# Patient Record
Sex: FEMALE | Race: WHITE | NOT HISPANIC OR LATINO
[De-identification: names, ages, dates, MRNs, and addresses within clinical notes are randomized per-mention and may not be internally consistent; named-entity substitution may affect disease eponyms.]

---

## 2017-02-13 ENCOUNTER — APPOINTMENT (OUTPATIENT)
Dept: NEUROLOGY | Facility: CLINIC | Age: 57
End: 2017-02-13

## 2017-02-13 VITALS
HEIGHT: 60 IN | WEIGHT: 163 LBS | HEART RATE: 68 BPM | SYSTOLIC BLOOD PRESSURE: 130 MMHG | DIASTOLIC BLOOD PRESSURE: 70 MMHG | BODY MASS INDEX: 32 KG/M2

## 2017-02-13 DIAGNOSIS — Z92.3 PERSONAL HISTORY OF IRRADIATION: ICD-10-CM

## 2017-02-13 RX ORDER — CYCLOBENZAPRINE HYDROCHLORIDE 5 MG/1
5 TABLET, FILM COATED ORAL
Qty: 30 | Refills: 5 | Status: ACTIVE | COMMUNITY
Start: 2017-02-13 | End: 1900-01-01

## 2017-02-23 ENCOUNTER — APPOINTMENT (OUTPATIENT)
Dept: NEUROLOGY | Facility: CLINIC | Age: 57
End: 2017-02-23

## 2017-05-07 ENCOUNTER — FORM ENCOUNTER (OUTPATIENT)
Age: 57
End: 2017-05-07

## 2017-08-14 ENCOUNTER — APPOINTMENT (OUTPATIENT)
Dept: NEUROLOGY | Facility: CLINIC | Age: 57
End: 2017-08-14
Payer: COMMERCIAL

## 2017-08-14 VITALS
WEIGHT: 156 LBS | BODY MASS INDEX: 30.63 KG/M2 | HEART RATE: 61 BPM | DIASTOLIC BLOOD PRESSURE: 76 MMHG | SYSTOLIC BLOOD PRESSURE: 120 MMHG | HEIGHT: 60 IN

## 2017-08-14 PROCEDURE — 99213 OFFICE O/P EST LOW 20 MIN: CPT

## 2017-11-12 ENCOUNTER — TRANSCRIPTION ENCOUNTER (OUTPATIENT)
Age: 57
End: 2017-11-12

## 2017-11-13 ENCOUNTER — LABORATORY RESULT (OUTPATIENT)
Age: 57
End: 2017-11-13

## 2017-11-13 ENCOUNTER — APPOINTMENT (OUTPATIENT)
Dept: NEPHROLOGY | Facility: CLINIC | Age: 57
End: 2017-11-13
Payer: COMMERCIAL

## 2017-11-13 VITALS
OXYGEN SATURATION: 97 % | HEART RATE: 89 BPM | SYSTOLIC BLOOD PRESSURE: 120 MMHG | BODY MASS INDEX: 31.83 KG/M2 | DIASTOLIC BLOOD PRESSURE: 91 MMHG | WEIGHT: 163 LBS

## 2017-11-13 DIAGNOSIS — Z86.018 PERSONAL HISTORY OF OTHER BENIGN NEOPLASM: ICD-10-CM

## 2017-11-13 DIAGNOSIS — R79.89 OTHER SPECIFIED ABNORMAL FINDINGS OF BLOOD CHEMISTRY: ICD-10-CM

## 2017-11-13 DIAGNOSIS — E55.9 VITAMIN D DEFICIENCY, UNSPECIFIED: ICD-10-CM

## 2017-11-13 DIAGNOSIS — H50.9 UNSPECIFIED STRABISMUS: ICD-10-CM

## 2017-11-13 DIAGNOSIS — Z86.69 PERSONAL HISTORY OF OTHER DISEASES OF THE NERVOUS SYSTEM AND SENSE ORGANS: ICD-10-CM

## 2017-11-13 DIAGNOSIS — M62.81 MUSCLE WEAKNESS (GENERALIZED): ICD-10-CM

## 2017-11-13 DIAGNOSIS — Z83.49 FAMILY HISTORY OF OTHER ENDOCRINE, NUTRITIONAL AND METABOLIC DISEASES: ICD-10-CM

## 2017-11-13 DIAGNOSIS — Z87.440 PERSONAL HISTORY OF URINARY (TRACT) INFECTIONS: ICD-10-CM

## 2017-11-13 DIAGNOSIS — D34 BENIGN NEOPLASM OF THYROID GLAND: ICD-10-CM

## 2017-11-13 DIAGNOSIS — Z87.891 PERSONAL HISTORY OF NICOTINE DEPENDENCE: ICD-10-CM

## 2017-11-13 DIAGNOSIS — E23.7 DISORDER OF PITUITARY GLAND, UNSPECIFIED: ICD-10-CM

## 2017-11-13 DIAGNOSIS — R80.9 PROTEINURIA, UNSPECIFIED: ICD-10-CM

## 2017-11-13 DIAGNOSIS — R31.9 HEMATURIA, UNSPECIFIED: ICD-10-CM

## 2017-11-13 PROCEDURE — 99243 OFF/OP CNSLTJ NEW/EST LOW 30: CPT

## 2017-11-14 ENCOUNTER — OUTPATIENT (OUTPATIENT)
Dept: OUTPATIENT SERVICES | Facility: HOSPITAL | Age: 57
LOS: 1 days | End: 2017-11-14
Payer: COMMERCIAL

## 2017-11-14 ENCOUNTER — APPOINTMENT (OUTPATIENT)
Dept: ULTRASOUND IMAGING | Facility: IMAGING CENTER | Age: 57
End: 2017-11-14
Payer: COMMERCIAL

## 2017-11-14 ENCOUNTER — RESULT REVIEW (OUTPATIENT)
Age: 57
End: 2017-11-14

## 2017-11-14 DIAGNOSIS — N18.1 CHRONIC KIDNEY DISEASE, STAGE 1: ICD-10-CM

## 2017-11-14 DIAGNOSIS — M62.9 DISORDER OF MUSCLE, UNSPECIFIED: Chronic | ICD-10-CM

## 2017-11-14 DIAGNOSIS — Z98.89 OTHER SPECIFIED POSTPROCEDURAL STATES: Chronic | ICD-10-CM

## 2017-11-14 DIAGNOSIS — R22.9 LOCALIZED SWELLING, MASS AND LUMP, UNSPECIFIED: Chronic | ICD-10-CM

## 2017-11-14 DIAGNOSIS — D48.9 NEOPLASM OF UNCERTAIN BEHAVIOR, UNSPECIFIED: Chronic | ICD-10-CM

## 2017-11-14 DIAGNOSIS — R58 HEMORRHAGE, NOT ELSEWHERE CLASSIFIED: Chronic | ICD-10-CM

## 2017-11-14 LAB
ALBUMIN SERPL ELPH-MCNC: 4.6 G/DL
ANION GAP SERPL CALC-SCNC: 18 MMOL/L
APPEARANCE: CLEAR
BASOPHILS # BLD AUTO: 0.02 K/UL
BASOPHILS NFR BLD AUTO: 0.2 %
BILIRUBIN URINE: NEGATIVE
BLOOD URINE: ABNORMAL
BUN SERPL-MCNC: 22 MG/DL
CALCIUM SERPL-MCNC: 9.5 MG/DL
CHLORIDE SERPL-SCNC: 102 MMOL/L
CK SERPL-CCNC: 147 U/L
CO2 SERPL-SCNC: 21 MMOL/L
COLOR: YELLOW
CREAT SERPL-MCNC: 1.01 MG/DL
CREAT SPEC-SCNC: 50 MG/DL
CREAT/PROT UR: 0.1 RATIO
DSDNA AB SER-ACNC: <12 IU/ML
EOSINOPHIL # BLD AUTO: 0.1 K/UL
EOSINOPHIL NFR BLD AUTO: 1.2 %
GLUCOSE QUALITATIVE U: NEGATIVE MG/DL
GLUCOSE SERPL-MCNC: 89 MG/DL
HBV SURFACE AB SER QL: REACTIVE
HCT VFR BLD CALC: 38.8 %
HCV AB SER QL: NONREACTIVE
HCV S/CO RATIO: 0.08 S/CO
HGB BLD-MCNC: 12.9 G/DL
IMM GRANULOCYTES NFR BLD AUTO: 0.2 %
KETONES URINE: NEGATIVE
LEUKOCYTE ESTERASE URINE: ABNORMAL
LYMPHOCYTES # BLD AUTO: 2.02 K/UL
LYMPHOCYTES NFR BLD AUTO: 24.2 %
M PROTEIN SPEC IFE-MCNC: NORMAL
MAN DIFF?: NORMAL
MCHC RBC-ENTMCNC: 29.9 PG
MCHC RBC-ENTMCNC: 33.2 GM/DL
MCV RBC AUTO: 89.8 FL
MONOCYTES # BLD AUTO: 0.76 K/UL
MONOCYTES NFR BLD AUTO: 9.1 %
MPO AB + PR3 PNL SER: NORMAL
NEUTROPHILS # BLD AUTO: 5.41 K/UL
NEUTROPHILS NFR BLD AUTO: 65.1 %
NITRITE URINE: POSITIVE
PH URINE: 6
PHOSPHATE SERPL-MCNC: 3.5 MG/DL
PLATELET # BLD AUTO: 273 K/UL
POTASSIUM SERPL-SCNC: 4.1 MMOL/L
PROT UR-MCNC: 4 MG/DL
PROTEIN URINE: NEGATIVE MG/DL
RBC # BLD: 4.32 M/UL
RBC # FLD: 13.3 %
SODIUM SERPL-SCNC: 141 MMOL/L
SPECIFIC GRAVITY URINE: 1.01
UROBILINOGEN URINE: NEGATIVE MG/DL
WBC # FLD AUTO: 8.33 K/UL

## 2017-11-14 PROCEDURE — 76770 US EXAM ABDO BACK WALL COMP: CPT | Mod: 26

## 2017-11-14 PROCEDURE — 76770 US EXAM ABDO BACK WALL COMP: CPT

## 2017-11-14 RX ORDER — EVOLOCUMAB 140 MG/ML
140 INJECTION, SOLUTION SUBCUTANEOUS
Refills: 0 | Status: ACTIVE | COMMUNITY
Start: 2017-11-14

## 2017-11-14 RX ORDER — LEVOFLOXACIN 250 MG/1
250 TABLET, FILM COATED ORAL DAILY
Qty: 3 | Refills: 0 | Status: ACTIVE | COMMUNITY
Start: 2017-11-14 | End: 1900-01-01

## 2017-11-15 LAB
ANA SER IF-ACNC: NEGATIVE
C3 SERPL-MCNC: 130 MG/DL
C4 SERPL-MCNC: 32 MG/DL
CORE LAB FLUID CYTOLOGY: NORMAL
DEPRECATED KAPPA LC FREE/LAMBDA SER: 1.49 RATIO
GBM AB TITR SER IF: <0.2 U
KAPPA LC CSF-MCNC: 1.26 MG/DL
KAPPA LC SERPL-MCNC: 1.88 MG/DL

## 2018-03-30 ENCOUNTER — APPOINTMENT (OUTPATIENT)
Dept: NEUROLOGY | Facility: CLINIC | Age: 58
End: 2018-03-30
Payer: COMMERCIAL

## 2018-03-30 VITALS — WEIGHT: 165 LBS | BODY MASS INDEX: 32.39 KG/M2 | HEIGHT: 60 IN

## 2018-03-30 VITALS — HEART RATE: 81 BPM | DIASTOLIC BLOOD PRESSURE: 90 MMHG | SYSTOLIC BLOOD PRESSURE: 134 MMHG

## 2018-03-30 PROCEDURE — 99213 OFFICE O/P EST LOW 20 MIN: CPT

## 2018-05-07 ENCOUNTER — FORM ENCOUNTER (OUTPATIENT)
Age: 58
End: 2018-05-07

## 2018-06-04 ENCOUNTER — FORM ENCOUNTER (OUTPATIENT)
Age: 58
End: 2018-06-04

## 2018-06-05 ENCOUNTER — FORM ENCOUNTER (OUTPATIENT)
Age: 58
End: 2018-06-05

## 2018-06-18 ENCOUNTER — FORM ENCOUNTER (OUTPATIENT)
Age: 58
End: 2018-06-18

## 2018-10-18 ENCOUNTER — APPOINTMENT (OUTPATIENT)
Dept: NEUROLOGY | Facility: CLINIC | Age: 58
End: 2018-10-18
Payer: COMMERCIAL

## 2018-10-18 VITALS
DIASTOLIC BLOOD PRESSURE: 82 MMHG | WEIGHT: 142 LBS | SYSTOLIC BLOOD PRESSURE: 126 MMHG | HEIGHT: 60 IN | BODY MASS INDEX: 27.88 KG/M2 | HEART RATE: 61 BPM

## 2018-10-18 DIAGNOSIS — G72.9 MYOPATHY, UNSPECIFIED: ICD-10-CM

## 2018-10-18 PROCEDURE — 99213 OFFICE O/P EST LOW 20 MIN: CPT

## 2018-10-18 RX ORDER — CYCLOBENZAPRINE HYDROCHLORIDE 5 MG/1
5 TABLET, FILM COATED ORAL
Qty: 30 | Refills: 5 | Status: ACTIVE | COMMUNITY
Start: 2018-10-18 | End: 1900-01-01

## 2019-09-14 ENCOUNTER — OUTPATIENT (OUTPATIENT)
Dept: OUTPATIENT SERVICES | Facility: HOSPITAL | Age: 59
LOS: 1 days | End: 2019-09-14
Payer: COMMERCIAL

## 2019-09-14 ENCOUNTER — APPOINTMENT (OUTPATIENT)
Dept: MRI IMAGING | Facility: IMAGING CENTER | Age: 59
End: 2019-09-14
Payer: COMMERCIAL

## 2019-09-14 DIAGNOSIS — R22.9 LOCALIZED SWELLING, MASS AND LUMP, UNSPECIFIED: Chronic | ICD-10-CM

## 2019-09-14 DIAGNOSIS — D48.9 NEOPLASM OF UNCERTAIN BEHAVIOR, UNSPECIFIED: Chronic | ICD-10-CM

## 2019-09-14 DIAGNOSIS — Z98.89 OTHER SPECIFIED POSTPROCEDURAL STATES: Chronic | ICD-10-CM

## 2019-09-14 DIAGNOSIS — Z00.8 ENCOUNTER FOR OTHER GENERAL EXAMINATION: ICD-10-CM

## 2019-09-14 DIAGNOSIS — R58 HEMORRHAGE, NOT ELSEWHERE CLASSIFIED: Chronic | ICD-10-CM

## 2019-09-14 DIAGNOSIS — M62.9 DISORDER OF MUSCLE, UNSPECIFIED: Chronic | ICD-10-CM

## 2019-09-14 PROCEDURE — 70551 MRI BRAIN STEM W/O DYE: CPT

## 2019-09-14 PROCEDURE — 70551 MRI BRAIN STEM W/O DYE: CPT | Mod: 26

## 2019-11-21 ENCOUNTER — APPOINTMENT (OUTPATIENT)
Dept: CT IMAGING | Facility: IMAGING CENTER | Age: 59
End: 2019-11-21
Payer: COMMERCIAL

## 2019-11-21 ENCOUNTER — OUTPATIENT (OUTPATIENT)
Dept: OUTPATIENT SERVICES | Facility: HOSPITAL | Age: 59
LOS: 1 days | End: 2019-11-21
Payer: COMMERCIAL

## 2019-11-21 DIAGNOSIS — Z98.89 OTHER SPECIFIED POSTPROCEDURAL STATES: Chronic | ICD-10-CM

## 2019-11-21 DIAGNOSIS — R22.9 LOCALIZED SWELLING, MASS AND LUMP, UNSPECIFIED: Chronic | ICD-10-CM

## 2019-11-21 DIAGNOSIS — Z00.8 ENCOUNTER FOR OTHER GENERAL EXAMINATION: ICD-10-CM

## 2019-11-21 DIAGNOSIS — R91.1 SOLITARY PULMONARY NODULE: ICD-10-CM

## 2019-11-21 DIAGNOSIS — M62.9 DISORDER OF MUSCLE, UNSPECIFIED: Chronic | ICD-10-CM

## 2019-11-21 DIAGNOSIS — M25.812 OTHER SPECIFIED JOINT DISORDERS, LEFT SHOULDER: ICD-10-CM

## 2019-11-21 DIAGNOSIS — D48.9 NEOPLASM OF UNCERTAIN BEHAVIOR, UNSPECIFIED: Chronic | ICD-10-CM

## 2019-11-21 DIAGNOSIS — R58 HEMORRHAGE, NOT ELSEWHERE CLASSIFIED: Chronic | ICD-10-CM

## 2019-11-21 PROCEDURE — 71250 CT THORAX DX C-: CPT

## 2019-11-21 PROCEDURE — 71250 CT THORAX DX C-: CPT | Mod: 26

## 2019-12-06 ENCOUNTER — APPOINTMENT (OUTPATIENT)
Dept: MRI IMAGING | Facility: IMAGING CENTER | Age: 59
End: 2019-12-06
Payer: COMMERCIAL

## 2019-12-06 ENCOUNTER — OUTPATIENT (OUTPATIENT)
Dept: OUTPATIENT SERVICES | Facility: HOSPITAL | Age: 59
LOS: 1 days | End: 2019-12-06
Payer: COMMERCIAL

## 2019-12-06 DIAGNOSIS — M62.9 DISORDER OF MUSCLE, UNSPECIFIED: Chronic | ICD-10-CM

## 2019-12-06 DIAGNOSIS — R58 HEMORRHAGE, NOT ELSEWHERE CLASSIFIED: Chronic | ICD-10-CM

## 2019-12-06 DIAGNOSIS — M25.812 OTHER SPECIFIED JOINT DISORDERS, LEFT SHOULDER: ICD-10-CM

## 2019-12-06 DIAGNOSIS — Z98.89 OTHER SPECIFIED POSTPROCEDURAL STATES: Chronic | ICD-10-CM

## 2019-12-06 DIAGNOSIS — R22.9 LOCALIZED SWELLING, MASS AND LUMP, UNSPECIFIED: Chronic | ICD-10-CM

## 2019-12-06 DIAGNOSIS — D48.9 NEOPLASM OF UNCERTAIN BEHAVIOR, UNSPECIFIED: Chronic | ICD-10-CM

## 2019-12-06 PROCEDURE — 73221 MRI JOINT UPR EXTREM W/O DYE: CPT | Mod: 26,LT,76

## 2019-12-06 PROCEDURE — 73221 MRI JOINT UPR EXTREM W/O DYE: CPT

## 2020-12-15 PROBLEM — Z87.440 HISTORY OF URINARY TRACT INFECTION: Status: RESOLVED | Noted: 2017-11-13 | Resolved: 2020-12-15

## 2021-02-19 ENCOUNTER — APPOINTMENT (OUTPATIENT)
Dept: RADIOLOGY | Facility: IMAGING CENTER | Age: 61
End: 2021-02-19
Payer: COMMERCIAL

## 2021-02-19 ENCOUNTER — OUTPATIENT (OUTPATIENT)
Dept: OUTPATIENT SERVICES | Facility: HOSPITAL | Age: 61
LOS: 1 days | End: 2021-02-19
Payer: COMMERCIAL

## 2021-02-19 ENCOUNTER — APPOINTMENT (OUTPATIENT)
Dept: CT IMAGING | Facility: IMAGING CENTER | Age: 61
End: 2021-02-19
Payer: COMMERCIAL

## 2021-02-19 DIAGNOSIS — D48.9 NEOPLASM OF UNCERTAIN BEHAVIOR, UNSPECIFIED: Chronic | ICD-10-CM

## 2021-02-19 DIAGNOSIS — R91.1 SOLITARY PULMONARY NODULE: ICD-10-CM

## 2021-02-19 DIAGNOSIS — R22.9 LOCALIZED SWELLING, MASS AND LUMP, UNSPECIFIED: Chronic | ICD-10-CM

## 2021-02-19 DIAGNOSIS — M62.9 DISORDER OF MUSCLE, UNSPECIFIED: Chronic | ICD-10-CM

## 2021-02-19 DIAGNOSIS — Z98.89 OTHER SPECIFIED POSTPROCEDURAL STATES: Chronic | ICD-10-CM

## 2021-02-19 DIAGNOSIS — R58 HEMORRHAGE, NOT ELSEWHERE CLASSIFIED: Chronic | ICD-10-CM

## 2021-02-19 PROCEDURE — 77080 DXA BONE DENSITY AXIAL: CPT

## 2021-02-19 PROCEDURE — 77080 DXA BONE DENSITY AXIAL: CPT | Mod: 26

## 2021-02-19 PROCEDURE — 71250 CT THORAX DX C-: CPT

## 2021-02-19 PROCEDURE — 71250 CT THORAX DX C-: CPT | Mod: 26

## 2021-09-11 ENCOUNTER — TRANSCRIPTION ENCOUNTER (OUTPATIENT)
Age: 61
End: 2021-09-11

## 2022-02-24 ENCOUNTER — APPOINTMENT (OUTPATIENT)
Dept: NEUROLOGY | Facility: CLINIC | Age: 62
End: 2022-02-24
Payer: COMMERCIAL

## 2022-02-24 VITALS
WEIGHT: 130 LBS | TEMPERATURE: 98.1 F | DIASTOLIC BLOOD PRESSURE: 86 MMHG | BODY MASS INDEX: 25.52 KG/M2 | HEIGHT: 60 IN | HEART RATE: 61 BPM | OXYGEN SATURATION: 98 % | SYSTOLIC BLOOD PRESSURE: 134 MMHG

## 2022-02-24 DIAGNOSIS — E79.2: ICD-10-CM

## 2022-02-24 DIAGNOSIS — M62.838 OTHER MUSCLE SPASM: ICD-10-CM

## 2022-02-24 DIAGNOSIS — G73.7: ICD-10-CM

## 2022-02-24 PROCEDURE — 99213 OFFICE O/P EST LOW 20 MIN: CPT

## 2022-02-25 PROBLEM — E79.2: Status: ACTIVE | Noted: 2018-03-30

## 2022-02-25 NOTE — DATA REVIEWED
[de-identified] : Electrodiagnostic studies in the past revealed a suspicion of myopathic disease.  General examination– [de-identified] : There are detailed consultation reports by Dr. Richard Rosario and Dr. Bailey including extensive investigations but her recent CPK studies were unremarkable and genetic testing was also nonrevealing and extensive metabolic function tests were also undertaken without any significant characterization.  Muscle biopsy report under the supervision of her previous neurologist ruled out any possibility of muscle disease of various kinds and I reviewed the histopathology report in details moreover her CPK is normal and while there is family history of some sort of her muscle disease which has never been characterized with an accurate diagnosis the suspicion remains that she has some form of muscle disorder.  I reviewed extensive lab tests and her immunological testing has been unremarkable including antibody negative myasthenia gravis.  I

## 2022-02-25 NOTE — PHYSICAL EXAM
[FreeTextEntry1] : General examination is unremarkable.  Head neck, ear nose and throat is unremarkable.  There is no carotid bruit, thyromegaly or lymphadenopathy.  Chest is clear and heart sounds are normal.  Abdomen is soft there is no tenderness.  Her left eye is 9/1 millimeter pupillary asymmetry but today I did notnotice any diplopia and there is history of left eye ptosis and history of glaucoma.  Optic disks are visualized without any pallor or papilledema significant evidence of optic atrophy and visual fields are full.\par \par Neurological examination revealed normal memory language cognitive and behavioral functions without anxiety or depression and cranial nerve examination revealed as noted ptosis of the left eye narrow palpebral fissure 1 mm pupillary asymmetry but no diplopia on confrontational test with normal visual fields.  There is no facial weakness bulbar functions are intact and there is no dysarthria gag reflex is normal hearing is preserved without any loss of sensorineural hearing function sense of smell and taste is preserved.\par \par Motor evaluation besides minimal weakness of the iliopsoas muscle of +4/5 is completely normal without any evidence of focal motor weakness or disproportionate proximal muscle weakness atrophy fasciculation or abnormal movements myokymia or myoedema and there is no myotonic disorder with normal finger-to-nose and heel-to-shin testing and rapid alternating movements and her deep tendon reflexes are 2+ symmetric throughout without Babinski sign.  Sensory evaluation is normal to fine touch pinprick position and vibration sense without any evidence of dermatomal or distal sensory loss and there is no sensory level.  She is able to ambulate and I do not find any gross ataxia and spinal evaluation is unremarkable.  Romberg sign is negative.\par \par

## 2022-02-25 NOTE — DISCUSSION/SUMMARY
[FreeTextEntry1] : Opinion–the patient carries a history of muscle disorder which has never been fully characterized either by laboratory investigations genetic testing or muscle biopsy but there is a family history which again has never been confirmed to be a myopathic disease but suspicion remains and I doubt that this is an inflammatory disorder or even metabolic disorder though there is a suspicion of possible mitochondrial disease  and was advised that in view of 15 years history with relatively good neurological examination she does have the option to have further investigations but there is no treatment available and this was discussed at length with the patient with the option of returning back for electrophysiologic testing but it could not be very helpful for any therapeutic approach.  Fall precautions were discussed and she was advised to remain under the care of her physicians specifically her endocrinologist for multiple medical issues including high cholesterol and is currently being treated with antiplatelet drugs.  She has the option to return back for follow-up on a as needed basis or if advised by her physicians but must remain under the care of her neurologist Dr. Cameron and she understands.  Extensive education and counseling was undertaken today.

## 2022-02-25 NOTE — REVIEW OF SYSTEMS
[Ataxia] : ataxia [Frequent Falls] : frequent falls [As Noted in HPI] : as noted in HPI [Negative] : Heme/Lymph

## 2022-02-25 NOTE — HISTORY OF PRESENT ILLNESS
[FreeTextEntry1] : Ms. Dixon is a 62-year-old  female who has been under the care of neurologist including Dr. Rosario and recently by Dr. 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  She also denied any family history of muscular dystrophy or metabolic muscle disorder.\par \par Her chronic symptoms ongoing for several years which is approximately 50 years continues to fluctuate and she has been under the care of Dr. Mynor Cameron who has been following her neurologically but has not performed any recent tests or prescribed any specific medications.  She continues to have poor balance and occasional falling episodes since last 15 years and has also been evaluated by Dr. Tha Massey and endocrinologist and has thyroid dysfunction but denied Hashimoto's thyroiditis and is currently on Synthroid 125 mcg alternating with 225 mcg and has also been under the care of a cardiologist for high cholesterol, high LDL cholesterol and treated with Repatha twice a week including Plavix baby aspirin and simvastatin.  She also had ocular surgeries bilaterally one on the right and 2 on the left for ocular dysfunction including diplopia and stated that she also has monocular diplopia.  She denied any tingling or numbness in the upper or lower extremities bowel or bladder dysfunction chest pain shortness of breath.\par \par Past history is as delineated including hypothyroidism oculomotor dysfunction requiring surgery, hypercholesterolemia and has been on Plavix aspirin and simvastatin.  I reviewed her medications and allergies and extensive medical records available in the electronic medical records.  There are excellent consultation reports

## 2022-09-08 DIAGNOSIS — E78.00 PURE HYPERCHOLESTEROLEMIA, UNSPECIFIED: ICD-10-CM

## 2022-09-08 DIAGNOSIS — G72.9 MYOPATHY, UNSPECIFIED: ICD-10-CM

## 2022-09-08 DIAGNOSIS — Z86.79 PERSONAL HISTORY OF OTHER DISEASES OF THE CIRCULATORY SYSTEM: ICD-10-CM

## 2022-09-08 DIAGNOSIS — M10.9 GOUT, UNSPECIFIED: ICD-10-CM

## 2022-09-08 DIAGNOSIS — I63.9 CEREBRAL INFARCTION, UNSPECIFIED: ICD-10-CM

## 2022-09-08 DIAGNOSIS — G72.2: ICD-10-CM

## 2022-09-08 DIAGNOSIS — E03.9 HYPOTHYROIDISM, UNSPECIFIED: ICD-10-CM

## 2022-09-08 DIAGNOSIS — T14.8XXA OTHER INJURY OF UNSPECIFIED BODY REGION, INITIAL ENCOUNTER: ICD-10-CM

## 2022-09-08 DIAGNOSIS — M67.88 OTHER SPECIFIED DISORDERS OF SYNOVIUM AND TENDON, OTHER SITE: ICD-10-CM

## 2022-09-08 RX ORDER — PANTOPRAZOLE 40 MG/1
TABLET, DELAYED RELEASE ORAL
Refills: 0 | Status: ACTIVE | COMMUNITY

## 2022-09-08 RX ORDER — CLOPIDOGREL BISULFATE 300 MG/1
TABLET, FILM COATED ORAL
Refills: 0 | Status: ACTIVE | COMMUNITY

## 2022-09-19 ENCOUNTER — APPOINTMENT (OUTPATIENT)
Dept: PODIATRY | Facility: CLINIC | Age: 62
End: 2022-09-19

## 2022-09-19 VITALS — BODY MASS INDEX: 25.52 KG/M2 | HEIGHT: 60 IN | WEIGHT: 130 LBS

## 2022-09-19 PROCEDURE — 99213 OFFICE O/P EST LOW 20 MIN: CPT | Mod: 25

## 2022-09-19 PROCEDURE — 73630 X-RAY EXAM OF FOOT: CPT | Mod: RT

## 2022-09-21 NOTE — PROCEDURE
[FreeTextEntry1] : X-ray Report; (Right foot - 3 views) I got x-rays that demonstrates end-stage arthrosis with bone on bone arthrosis and spurring appreciated at the 1st MPJ. No sign of acute fracture.

## 2022-09-21 NOTE — ASSESSMENT
[FreeTextEntry1] : \par Impression: Hallux limitus. Painful 1st MPJ.\par \par Treatment:  I discussed with the patient all treatment options. I discussed conservative vs. surgical alternatives including postoperative course and possible complications. In the meantime I recommended her wearing very stiff sneakers that she could continue to exercise but needs to ice down immediately afterwards. She is on blood thinners so I want her to avoid anti-inflammatories.  She uses CBD topically and she will follow-up with continued pain that persists and interferes with her lifestyle.

## 2022-09-21 NOTE — HISTORY OF PRESENT ILLNESS
[Sneakers] : arun [FreeTextEntry1] : Patient presents today for hallux limitus and pain at the right 1st MPJ. She does a lot of working, a lot of steps per day, and the problem is progressing. It is getting a little worse, more swollen and problematic. It has to do with an old crush injury and she is noticing some more numbness today.  She last saw Dr. Galvez a couple of months ago.

## 2022-11-14 NOTE — PHYSICAL EXAM
[2+] : left foot dorsalis pedis 2+ [Sensation] : the sensory exam was normal to light touch and pinprick [No Focal Deficits] : no focal deficits [Deep Tendon Reflexes (DTR)] : deep tendon reflexes were 2+ and symmetric [Motor Exam] : the motor exam was normal [Delayed in the Right Toes] : capillary refills normal in right toes [Delayed in the Left Toes] : capillary refills normal in the left toes [de-identified] : She pretty much has hallux limitus, very limited motion and what motion she has is quite painful at the MPJ. [FreeTextEntry1] : She has swelling about the right 1st MPJ that is mild to moderate. Gabapentin Counseling: I discussed with the patient the risks of gabapentin including but not limited to dizziness, somnolence, fatigue and ataxia.

## 2022-12-19 ENCOUNTER — APPOINTMENT (OUTPATIENT)
Dept: MRI IMAGING | Facility: IMAGING CENTER | Age: 62
End: 2022-12-19

## 2022-12-19 ENCOUNTER — OUTPATIENT (OUTPATIENT)
Dept: OUTPATIENT SERVICES | Facility: HOSPITAL | Age: 62
LOS: 1 days | End: 2022-12-19
Payer: COMMERCIAL

## 2022-12-19 DIAGNOSIS — D48.9 NEOPLASM OF UNCERTAIN BEHAVIOR, UNSPECIFIED: Chronic | ICD-10-CM

## 2022-12-19 DIAGNOSIS — Z00.8 ENCOUNTER FOR OTHER GENERAL EXAMINATION: ICD-10-CM

## 2022-12-19 DIAGNOSIS — R22.9 LOCALIZED SWELLING, MASS AND LUMP, UNSPECIFIED: Chronic | ICD-10-CM

## 2022-12-19 DIAGNOSIS — Z98.89 OTHER SPECIFIED POSTPROCEDURAL STATES: Chronic | ICD-10-CM

## 2022-12-19 DIAGNOSIS — M62.9 DISORDER OF MUSCLE, UNSPECIFIED: Chronic | ICD-10-CM

## 2022-12-19 DIAGNOSIS — R58 HEMORRHAGE, NOT ELSEWHERE CLASSIFIED: Chronic | ICD-10-CM

## 2022-12-19 PROCEDURE — 70551 MRI BRAIN STEM W/O DYE: CPT | Mod: 26

## 2022-12-19 PROCEDURE — 70551 MRI BRAIN STEM W/O DYE: CPT

## 2023-03-27 ENCOUNTER — APPOINTMENT (OUTPATIENT)
Dept: PODIATRY | Facility: CLINIC | Age: 63
End: 2023-03-27
Payer: MEDICARE

## 2023-03-27 DIAGNOSIS — M20.5X9 OTHER DEFORMITIES OF TOE(S) (ACQUIRED), UNSPECIFIED FOOT: ICD-10-CM

## 2023-03-27 PROCEDURE — 99214 OFFICE O/P EST MOD 30 MIN: CPT

## 2023-03-28 PROBLEM — M20.5X9 HALLUX LIMITUS: Status: ACTIVE | Noted: 2022-09-08

## 2023-03-29 NOTE — HISTORY OF PRESENT ILLNESS
[Sneakers] : arun [FreeTextEntry1] : Patient presents today because of continued 1st MTPJ pain despite me getting Maxwell's extension and stiff shoes she continues to have pain. We have already discussed 1st MPJ fusion. She presents today asking about going through surgical consent and getting it scheduled.

## 2023-03-29 NOTE — ASSESSMENT
[FreeTextEntry1] : \par Impression: Painful right 1st MPJ. Hallux limitus. \par \par Treatment: I discussed 1st MPJ fusion procedure with the patient. I discussed conservative vs. surgical alternatives again. She sees conservative treatment is not working and she wants to proceed with 1st MPJ fusion. I discussed 1st MPJ procedure in detail. I discussed the possibility of delayed healing, delayed or nonunion or infection.\par I had a long discussion with the patient regarding procedure to be performed. This includes preoperative expectations, as well as operation to be performed, and postoperative expectations. We discussed risks, benefits and options with risks to include, but not limited to, delayed healing, postoperative infection, chronic swelling and numbness, under and over correction of the deformity, permanent numbness, painful scar, stiffness, chronic pain, as well as a need for further surgery, arthritis, AVN, DVT or amputation.  We discussed anesthesia to be utilized and expectations postoperatively of limited or non weight-bearing activities, use of crutches and/or walker, elevation and follow-up visits. Surgical consent was then signed and initialed by both the patient and myself. Patient is scheduled for 1st MPJ fusion with internal fixation.  Patient was allowed to ask all questions.  All questions were answered in detail. Patient has a clear understanding of the procedures to be performed.  Any questions or problems prior to that time, patient is to contact this office for further discussion.\par \par

## 2023-03-29 NOTE — PHYSICAL EXAM
[2+] : left foot dorsalis pedis 2+ [Sensation] : the sensory exam was normal to light touch and pinprick [No Focal Deficits] : no focal deficits [Deep Tendon Reflexes (DTR)] : deep tendon reflexes were 2+ and symmetric [Motor Exam] : the motor exam was normal [Delayed in the Right Toes] : capillary refills normal in right toes [Delayed in the Left Toes] : capillary refills normal in the left toes [de-identified] : She has hallux limitus, very limited motion and what motion she has is quite painful at the MPJ. [FreeTextEntry1] : She has swelling about the right 1st MPJ that is mild to moderate.

## 2023-04-21 ENCOUNTER — OUTPATIENT (OUTPATIENT)
Dept: OUTPATIENT SERVICES | Facility: HOSPITAL | Age: 63
LOS: 1 days | End: 2023-04-21
Payer: MEDICARE

## 2023-04-21 ENCOUNTER — APPOINTMENT (OUTPATIENT)
Dept: ULTRASOUND IMAGING | Facility: IMAGING CENTER | Age: 63
End: 2023-04-21
Payer: MEDICARE

## 2023-04-21 DIAGNOSIS — R22.9 LOCALIZED SWELLING, MASS AND LUMP, UNSPECIFIED: Chronic | ICD-10-CM

## 2023-04-21 DIAGNOSIS — Z00.8 ENCOUNTER FOR OTHER GENERAL EXAMINATION: ICD-10-CM

## 2023-04-21 DIAGNOSIS — R58 HEMORRHAGE, NOT ELSEWHERE CLASSIFIED: Chronic | ICD-10-CM

## 2023-04-21 DIAGNOSIS — Z98.89 OTHER SPECIFIED POSTPROCEDURAL STATES: Chronic | ICD-10-CM

## 2023-04-21 DIAGNOSIS — D48.9 NEOPLASM OF UNCERTAIN BEHAVIOR, UNSPECIFIED: Chronic | ICD-10-CM

## 2023-04-21 DIAGNOSIS — M62.9 DISORDER OF MUSCLE, UNSPECIFIED: Chronic | ICD-10-CM

## 2023-04-21 PROCEDURE — 10005 FNA BX W/US GDN 1ST LES: CPT

## 2023-04-21 PROCEDURE — 88173 CYTOPATH EVAL FNA REPORT: CPT

## 2023-04-21 PROCEDURE — 88172 CYTP DX EVAL FNA 1ST EA SITE: CPT

## 2023-04-21 PROCEDURE — 88173 CYTOPATH EVAL FNA REPORT: CPT | Mod: 26

## 2023-04-24 LAB — NON-GYNECOLOGICAL CYTOLOGY STUDY: SIGNIFICANT CHANGE UP

## 2023-08-08 ENCOUNTER — OUTPATIENT (OUTPATIENT)
Dept: OUTPATIENT SERVICES | Facility: HOSPITAL | Age: 63
LOS: 1 days | End: 2023-08-08
Payer: MEDICARE

## 2023-08-08 VITALS
WEIGHT: 130.07 LBS | HEIGHT: 60 IN | OXYGEN SATURATION: 98 % | RESPIRATION RATE: 16 BRPM | TEMPERATURE: 99 F | SYSTOLIC BLOOD PRESSURE: 112 MMHG | DIASTOLIC BLOOD PRESSURE: 78 MMHG | HEART RATE: 57 BPM

## 2023-08-08 DIAGNOSIS — Z98.89 OTHER SPECIFIED POSTPROCEDURAL STATES: Chronic | ICD-10-CM

## 2023-08-08 DIAGNOSIS — M62.9 DISORDER OF MUSCLE, UNSPECIFIED: Chronic | ICD-10-CM

## 2023-08-08 DIAGNOSIS — E03.9 HYPOTHYROIDISM, UNSPECIFIED: ICD-10-CM

## 2023-08-08 DIAGNOSIS — E78.5 HYPERLIPIDEMIA, UNSPECIFIED: ICD-10-CM

## 2023-08-08 DIAGNOSIS — Z95.5 PRESENCE OF CORONARY ANGIOPLASTY IMPLANT AND GRAFT: ICD-10-CM

## 2023-08-08 DIAGNOSIS — Z01.818 ENCOUNTER FOR OTHER PREPROCEDURAL EXAMINATION: ICD-10-CM

## 2023-08-08 DIAGNOSIS — R58 HEMORRHAGE, NOT ELSEWHERE CLASSIFIED: Chronic | ICD-10-CM

## 2023-08-08 DIAGNOSIS — Z29.9 ENCOUNTER FOR PROPHYLACTIC MEASURES, UNSPECIFIED: ICD-10-CM

## 2023-08-08 DIAGNOSIS — M20.5X9 OTHER DEFORMITIES OF TOE(S) (ACQUIRED), UNSPECIFIED FOOT: ICD-10-CM

## 2023-08-08 DIAGNOSIS — D48.9 NEOPLASM OF UNCERTAIN BEHAVIOR, UNSPECIFIED: Chronic | ICD-10-CM

## 2023-08-08 DIAGNOSIS — R22.9 LOCALIZED SWELLING, MASS AND LUMP, UNSPECIFIED: Chronic | ICD-10-CM

## 2023-08-08 PROCEDURE — G0463: CPT

## 2023-08-08 NOTE — H&P PST ADULT - PROBLEM SELECTOR PROBLEM 4
Date:March 22, 2022      Provider requested that no letter be sent. Do not send.       [facility]       Hyperlipidemia

## 2023-08-08 NOTE — H&P PST ADULT - HISTORY OF PRESENT ILLNESS
64 yo female with history of Hypothyroidism, HLD, CAD with stent x 1 (placed in 2015), Osteoarthritis in the right great, reports the above.  Patient states that she has been in pain, in the right great to for approximately five years. She states that she has not been able to wear shoes, apply pressure on the right great toe when walking.  She is scheduled for : Right Foot 1st Metatarsal Phalangeal Joint Fusion, on 8/17/23

## 2023-08-08 NOTE — H&P PST ADULT - PROBLEM SELECTOR PLAN 1
Right Foot 1st Metatarsal Phalangeal Joint Fusion      STOP BANG : 1  Low risk for SUZE complications

## 2023-08-08 NOTE — H&P PST ADULT - NSICDXFAMILYHX_GEN_ALL_CORE_FT
FAMILY HISTORY:  Father  Still living? Yes, Estimated age: Age Unknown  FH: prostate cancer, Age at diagnosis: Age Unknown

## 2023-08-08 NOTE — H&P PST ADULT - NSICDXPASTMEDICALHX_GEN_ALL_CORE_FT
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 Calderon (neurologist, annually (last had sonogram of pituitary in February 2013).    Reactive airway disease     Snoring SUZE precautions -- responds affirmatively to STOP BANG questionnaire -- await results of sleep study done Fall 2013 to confirm diagnosis    Thyroid disorder was treated with radioactive iodine in approximately 1994 and placed on synthroid for 12 years. Medication was stopped by the MD after 12 years    Thyroid nodule multiple. As of 12/12/13, results of biopsy are pending    Vitamin d deficiency

## 2023-08-08 NOTE — H&P PST ADULT - NSICDXPASTSURGICALHX_GEN_ALL_CORE_FT
PAST SURGICAL HISTORY:  Bleeding h/o 4 D&Cs in her early 20's    Giant cell tumor surgery on right thumb in 2012    [place] Fall 2013  thyroid biopsy -- as of PAST appointment 12/12/13, results pending    Muscle disease b/l eye surgery age 1, 3 and 5    S/P tonsillectomy

## 2023-08-08 NOTE — H&P PST ADULT - NSANTHBPHIGHRD_ENT_A_CORE
[de-identified] : 54 yo F here for surveillance of a left occipital lymph node which her boyfriend first noticed in December of 2018. She reports that it is no longer palpable. She has had no recent infections, fever, or LAD.  She reports that her sternoclavicular joint pain comes and goes, usually activates when her shoulder bursitis is exacerbated. \par  \par 
No

## 2023-08-08 NOTE — H&P PST ADULT - ATTENDING COMMENTS
Here for severely painful hallux limitus right foot. Inteferes with life and wants surgery. Again reviewed First MPJ fusion procedure and answered all questions. Discussed infection, DVT, NON union, continued pain syndrome or incisional problems and she still wants to proceed. Consent signed

## 2023-08-16 ENCOUNTER — TRANSCRIPTION ENCOUNTER (OUTPATIENT)
Age: 63
End: 2023-08-16

## 2023-08-17 ENCOUNTER — OUTPATIENT (OUTPATIENT)
Dept: OUTPATIENT SERVICES | Facility: HOSPITAL | Age: 63
LOS: 1 days | End: 2023-08-17
Payer: MEDICARE

## 2023-08-17 ENCOUNTER — APPOINTMENT (OUTPATIENT)
Dept: PODIATRY | Facility: HOSPITAL | Age: 63
End: 2023-08-17
Payer: MEDICARE

## 2023-08-17 ENCOUNTER — TRANSCRIPTION ENCOUNTER (OUTPATIENT)
Age: 63
End: 2023-08-17

## 2023-08-17 ENCOUNTER — RESULT REVIEW (OUTPATIENT)
Age: 63
End: 2023-08-17

## 2023-08-17 VITALS
SYSTOLIC BLOOD PRESSURE: 103 MMHG | DIASTOLIC BLOOD PRESSURE: 65 MMHG | OXYGEN SATURATION: 100 % | RESPIRATION RATE: 16 BRPM | TEMPERATURE: 98 F | HEART RATE: 57 BPM

## 2023-08-17 VITALS
SYSTOLIC BLOOD PRESSURE: 113 MMHG | HEIGHT: 60 IN | OXYGEN SATURATION: 99 % | TEMPERATURE: 99 F | RESPIRATION RATE: 16 BRPM | HEART RATE: 59 BPM | DIASTOLIC BLOOD PRESSURE: 73 MMHG | WEIGHT: 130.07 LBS

## 2023-08-17 DIAGNOSIS — D48.9 NEOPLASM OF UNCERTAIN BEHAVIOR, UNSPECIFIED: Chronic | ICD-10-CM

## 2023-08-17 DIAGNOSIS — R22.9 LOCALIZED SWELLING, MASS AND LUMP, UNSPECIFIED: Chronic | ICD-10-CM

## 2023-08-17 DIAGNOSIS — Z98.89 OTHER SPECIFIED POSTPROCEDURAL STATES: Chronic | ICD-10-CM

## 2023-08-17 DIAGNOSIS — R58 HEMORRHAGE, NOT ELSEWHERE CLASSIFIED: Chronic | ICD-10-CM

## 2023-08-17 DIAGNOSIS — M20.5X9 OTHER DEFORMITIES OF TOE(S) (ACQUIRED), UNSPECIFIED FOOT: ICD-10-CM

## 2023-08-17 DIAGNOSIS — M62.9 DISORDER OF MUSCLE, UNSPECIFIED: Chronic | ICD-10-CM

## 2023-08-17 PROBLEM — Z86.69 PERSONAL HISTORY OF OTHER DISEASES OF THE NERVOUS SYSTEM AND SENSE ORGANS: Chronic | Status: ACTIVE | Noted: 2023-08-08

## 2023-08-17 PROCEDURE — 28750 FUSION OF BIG TOE JOINT: CPT | Mod: RT

## 2023-08-17 PROCEDURE — 73630 X-RAY EXAM OF FOOT: CPT

## 2023-08-17 PROCEDURE — C1713: CPT

## 2023-08-17 PROCEDURE — 73630 X-RAY EXAM OF FOOT: CPT | Mod: 26,RT

## 2023-08-17 DEVICE — IMPLANTABLE DEVICE: Type: IMPLANTABLE DEVICE | Status: FUNCTIONAL

## 2023-08-17 DEVICE — SCREW LOKG LG HD 2.7X16MM: Type: IMPLANTABLE DEVICE | Status: FUNCTIONAL

## 2023-08-17 DEVICE — SCREW LOKG LG HD 2.7X12MM: Type: IMPLANTABLE DEVICE | Status: FUNCTIONAL

## 2023-08-17 DEVICE — K-WIRE WRIGHT MEDICAL (SINGLE TROCAR) 1.6MM X 150MM: Type: IMPLANTABLE DEVICE | Status: FUNCTIONAL

## 2023-08-17 DEVICE — PIN FIXATION TEMP 1.4MM LG: Type: IMPLANTABLE DEVICE | Status: FUNCTIONAL

## 2023-08-17 RX ORDER — OXYCODONE HYDROCHLORIDE 5 MG/1
5 TABLET ORAL ONCE
Refills: 0 | Status: DISCONTINUED | OUTPATIENT
Start: 2023-08-17 | End: 2023-08-31

## 2023-08-17 RX ORDER — SODIUM CHLORIDE 9 MG/ML
1000 INJECTION, SOLUTION INTRAVENOUS
Refills: 0 | Status: DISCONTINUED | OUTPATIENT
Start: 2023-08-17 | End: 2023-08-17

## 2023-08-17 RX ORDER — OXYCODONE AND ACETAMINOPHEN 5; 325 MG/1; MG/1
5-325 TABLET ORAL
Qty: 15 | Refills: 0 | Status: ACTIVE | COMMUNITY
Start: 2023-08-17 | End: 1900-01-01

## 2023-08-17 RX ORDER — FENTANYL CITRATE 50 UG/ML
50 INJECTION INTRAVENOUS
Refills: 0 | Status: DISCONTINUED | OUTPATIENT
Start: 2023-08-17 | End: 2023-08-17

## 2023-08-17 RX ORDER — FENTANYL CITRATE 50 UG/ML
25 INJECTION INTRAVENOUS
Refills: 0 | Status: DISCONTINUED | OUTPATIENT
Start: 2023-08-17 | End: 2023-08-17

## 2023-08-17 RX ORDER — SODIUM CHLORIDE 9 MG/ML
3 INJECTION INTRAMUSCULAR; INTRAVENOUS; SUBCUTANEOUS EVERY 8 HOURS
Refills: 0 | Status: DISCONTINUED | OUTPATIENT
Start: 2023-08-17 | End: 2023-08-17

## 2023-08-17 NOTE — ASU PATIENT PROFILE, ADULT - FALL HARM RISK - UNIVERSAL INTERVENTIONS
Bed in lowest position, wheels locked, appropriate side rails in place/Call bell, personal items and telephone in reach/Instruct patient to call for assistance before getting out of bed or chair/Non-slip footwear when patient is out of bed/[place] to call system/Physically safe environment - no spills, clutter or unnecessary equipment/Purposeful Proactive Rounding/Room/bathroom lighting operational, light cord in reach

## 2023-08-17 NOTE — ASU DISCHARGE PLAN (ADULT/PEDIATRIC) - CARE PROVIDER_API CALL
Kar Mora  Foot Surgery  [address] [place] [address]  Phone: [phone]  Fax: [phone]  Follow Up Time:

## 2023-08-17 NOTE — BRIEF OPERATIVE NOTE - NSICDXBRIEFPROCEDURE_GEN_ALL_CORE_FT
PROCEDURES:  Fusion of first metatarsophalangeal joint of foot 17-Aug-2023 13:41:14  Eulalia Rowan

## 2023-08-17 NOTE — PHYSICAL THERAPY INITIAL EVALUATION ADULT - PERTINENT HX OF CURRENT PROBLEM, REHAB EVAL
Patient is s/p (R) foot surgery. Patient states has been sufferring from pain on right foot; pt. states has been receiving Steroid injections.

## 2023-08-17 NOTE — ASU DISCHARGE PLAN (ADULT/PEDIATRIC) - NS MD DC FALL RISK RISK
For information on Fall & Injury Prevention, visit: [email or web address] OR  [email or web address] OR  https://www.cdc.gov/steadi/patient.html

## 2023-08-21 ENCOUNTER — APPOINTMENT (OUTPATIENT)
Dept: PODIATRY | Facility: CLINIC | Age: 63
End: 2023-08-21
Payer: MEDICARE

## 2023-08-21 PROCEDURE — 99024 POSTOP FOLLOW-UP VISIT: CPT

## 2023-08-23 ENCOUNTER — APPOINTMENT (OUTPATIENT)
Dept: PODIATRY | Facility: CLINIC | Age: 63
End: 2023-08-23
Payer: MEDICARE

## 2023-08-23 DIAGNOSIS — L03.031 CELLULITIS OF RIGHT TOE: ICD-10-CM

## 2023-08-23 PROCEDURE — 99024 POSTOP FOLLOW-UP VISIT: CPT

## 2023-08-23 RX ORDER — DOXYCYCLINE HYCLATE 100 MG/1
100 TABLET ORAL
Qty: 14 | Refills: 0 | Status: ACTIVE | COMMUNITY
Start: 2023-08-23 | End: 1900-01-01

## 2023-08-25 NOTE — PHYSICAL EXAM
[2+] : left foot dorsalis pedis 2+ [Sensation] : the sensory exam was normal to light touch and pinprick [No Focal Deficits] : no focal deficits [Deep Tendon Reflexes (DTR)] : deep tendon reflexes were 2+ and symmetric [Motor Exam] : the motor exam was normal [Delayed in the Right Toes] : capillary refills normal in right toes [Delayed in the Left Toes] : capillary refills normal in the left toes [FreeTextEntry3] : Negative signs of ischemia or necrosis noted. [FreeTextEntry1] : Normal postop. edema noted to the 1st MPJ area with mild ecchymosis. Negative calor. Sutures are intact. Dorsal medial incision is clean. Negative dehiscence.  Negative purulence. Negative cellulitis or acute signs of infection noted. Negative pain on palpation. Negative skin slough or blistering.

## 2023-08-25 NOTE — HISTORY OF PRESENT ILLNESS
[Post-op Sandals] : post-op sandals [Cane] : a cane [FreeTextEntry1] : Patient presents today postop. day #4 for 1st MPJ fusion of the right foot. She presents wearing her surgical shoe and the dressing is clean, dry and intact to the right foot. She denies any pain, fever, chills, nausea or vomiting. She states she took Tylenol the first day, every 4 to 6 hours as instructed and on Friday she had some pain. She took one of the pain medications that was prescribed and states she has not required additional medication at this time. She has been elevating her foot. She has been keeping her foot dry in the shower and has been ambulating with a cane.

## 2023-08-25 NOTE — ASSESSMENT
[FreeTextEntry1] : Impression: Postop. day #4; 1st MPJ fusion right foot.  Treatment: Discussed findings and conditions with the patient. A dry sterile surgical dressing was reapplied to the right foot. A new surgical shoe was dispensed as the previous one was too big. She as fitted. She states she feels much better and stable with the proper fitting shoe. She is to continue ambulating with the cane. She is to keep the dressing clean, dry and intact. She is to continue ambulating with the surgical shoe as instructed and heel touch. The patient is to return in one week with Dr. Mora. Any fever, chills, nausea or vomiting, change in appearance, problems or concerns, she is to contact the office for an earlier appointment.

## 2023-08-28 NOTE — PHYSICAL EXAM
[2+] : left foot dorsalis pedis 2+ [Sensation] : the sensory exam was normal to light touch and pinprick [No Focal Deficits] : no focal deficits [Deep Tendon Reflexes (DTR)] : deep tendon reflexes were 2+ and symmetric [Motor Exam] : the motor exam was normal [Ankle Swelling (On Exam)] : not present [Varicose Veins Of Lower Extremities] : not present [FreeTextEntry3] : CFT: 3 seconds x 10.  Temperature gradient: normal.   [de-identified] : Right foot S/P 1st MPJ fusion, stable.   [FreeTextEntry1] : Right foot, dorsal incision over the 1st MPJ with periwound erythema and increased warmth.  Normal postoperative edema.  Sutures are intact.  Incision is well coapted.  No dehiscence.  No fluctuance, purulence, drainage, necrosis, soft tissue crepitus.   Right dorsal toes 2 through 4 with ecchymosis present with no swelling or pain on palpation.

## 2023-08-28 NOTE — ASSESSMENT
[FreeTextEntry1] : Impression: Postoperative S/P right 1st MPJ fusion (Z48.89) with periwound cellulitis (L03.031).  Right foot ecchymosis.    Treatment: Discussed findings with the patient.  The discoloration she saw today is related to postoperative ecchymosis.  It is not gangrene.  Sterile dressing was replaced.  Patient states that it was not too tight.  CFT was checked and was normal.   For cellulitis, patient was prescribed oral Doxycycline and she was advised to monitor for any fever, chills or drainage at the dressing site.  Call immediately if that occurs.  Otherwise, finish the antibiotic and follow-up with Dr. Mora on Thursday for suture removal.  Continue to ambulate in the surgical shoe.  Keep the dressing clean, dry and intact.  Do not get it wet.  Elevate when resting.  Patient can apply light ice to the area if needed.

## 2023-08-30 ENCOUNTER — APPOINTMENT (OUTPATIENT)
Dept: PODIATRY | Facility: CLINIC | Age: 63
End: 2023-08-30
Payer: MEDICARE

## 2023-08-30 PROCEDURE — 73630 X-RAY EXAM OF FOOT: CPT | Mod: RT

## 2023-09-01 NOTE — ASSESSMENT
[FreeTextEntry1] : Impression: Postop. right 1st MPJ fusion.  Treatment: The incisions are well coapted. I took the sutures out and put steri-strips on and a sterile surgical dressing. Continue fracture shoe and mild activity. I will see the patient back in 2 weeks and make a determination as to treatment going forward.

## 2023-09-01 NOTE — PHYSICAL EXAM
[2+] : left foot dorsalis pedis 2+ [Sensation] : the sensory exam was normal to light touch and pinprick [No Focal Deficits] : no focal deficits [Deep Tendon Reflexes (DTR)] : deep tendon reflexes were 2+ and symmetric [Motor Exam] : the motor exam was normal [Delayed in the Right Toes] : capillary refills normal in right toes [Delayed in the Left Toes] : capillary refills normal in the left toes [FreeTextEntry3] : Negative signs of ischemia or necrosis noted. [FreeTextEntry1] : Incision is well coapted. Swelling is essentially resolved and there is certainly no erythema, drainage or signs of infection whatsoever. She is actually doing quite well.

## 2023-09-01 NOTE — HISTORY OF PRESENT ILLNESS
[FreeTextEntry1] : Patient presents today 2 weeks S/P right 1st MPJ fusion. She is doing great with no complaints of pain. She has been active in a surgical shoe without problem. She was started on Doxycycline but presents today.

## 2023-09-01 NOTE — PROCEDURE
[FreeTextEntry1] : X-rays taken to evaluate surgical site. X-ray Report:  (Right foot - 3 views) X-rays demonstrate excellent repositioning of the 1st ray with excellent positioning noted with plate intact.  Clinically the plate is non-palpable but I discussed the possibility of it being removed.

## 2023-09-13 ENCOUNTER — APPOINTMENT (OUTPATIENT)
Dept: GASTROENTEROLOGY | Facility: CLINIC | Age: 63
End: 2023-09-13
Payer: MEDICARE

## 2023-09-13 ENCOUNTER — APPOINTMENT (OUTPATIENT)
Dept: PODIATRY | Facility: CLINIC | Age: 63
End: 2023-09-13
Payer: MEDICARE

## 2023-09-13 VITALS
RESPIRATION RATE: 12 BRPM | DIASTOLIC BLOOD PRESSURE: 70 MMHG | OXYGEN SATURATION: 97 % | WEIGHT: 131 LBS | BODY MASS INDEX: 25.72 KG/M2 | TEMPERATURE: 97.8 F | HEIGHT: 60 IN | SYSTOLIC BLOOD PRESSURE: 104 MMHG | HEART RATE: 65 BPM

## 2023-09-13 DIAGNOSIS — Z86.39 PERSONAL HISTORY OF OTHER ENDOCRINE, NUTRITIONAL AND METABOLIC DISEASE: ICD-10-CM

## 2023-09-13 DIAGNOSIS — Z80.0 FAMILY HISTORY OF MALIGNANT NEOPLASM OF DIGESTIVE ORGANS: ICD-10-CM

## 2023-09-13 DIAGNOSIS — Z12.11 ENCOUNTER FOR SCREENING FOR MALIGNANT NEOPLASM OF COLON: ICD-10-CM

## 2023-09-13 DIAGNOSIS — I25.10 ATHEROSCLEROTIC HEART DISEASE OF NATIVE CORONARY ARTERY W/OUT ANGINA PECTORIS: ICD-10-CM

## 2023-09-13 DIAGNOSIS — Z80.0 ENCOUNTER FOR SCREENING FOR MALIGNANT NEOPLASM OF COLON: ICD-10-CM

## 2023-09-13 PROCEDURE — 99024 POSTOP FOLLOW-UP VISIT: CPT

## 2023-09-13 PROCEDURE — 99204 OFFICE O/P NEW MOD 45 MIN: CPT

## 2023-09-13 RX ORDER — SODIUM SULFATE, POTASSIUM SULFATE AND MAGNESIUM SULFATE 1.6; 3.13; 17.5 G/177ML; G/177ML; G/177ML
17.5-3.13-1.6 SOLUTION ORAL TWICE DAILY
Qty: 2 | Refills: 0 | Status: ACTIVE | COMMUNITY
Start: 2023-09-13 | End: 1900-01-01

## 2023-09-13 RX ORDER — CLOPIDOGREL BISULFATE 75 MG/1
75 TABLET, FILM COATED ORAL
Refills: 0 | Status: ACTIVE | COMMUNITY

## 2023-09-27 ENCOUNTER — APPOINTMENT (OUTPATIENT)
Dept: PODIATRY | Facility: CLINIC | Age: 63
End: 2023-09-27
Payer: MEDICARE

## 2023-09-27 DIAGNOSIS — M20.21 HALLUX RIGIDUS, RIGHT FOOT: ICD-10-CM

## 2023-09-27 PROCEDURE — 73630 X-RAY EXAM OF FOOT: CPT | Mod: RT

## 2023-09-28 PROBLEM — M20.21 HALLUX RIGIDUS OF RIGHT FOOT: Status: ACTIVE | Noted: 2023-08-31

## 2023-11-13 ENCOUNTER — APPOINTMENT (OUTPATIENT)
Dept: PODIATRY | Facility: CLINIC | Age: 63
End: 2023-11-13
Payer: MEDICARE

## 2023-11-13 DIAGNOSIS — M79.671 PAIN IN RIGHT FOOT: ICD-10-CM

## 2023-11-13 PROCEDURE — 99213 OFFICE O/P EST LOW 20 MIN: CPT | Mod: 24

## 2023-11-14 ENCOUNTER — OUTPATIENT (OUTPATIENT)
Dept: OUTPATIENT SERVICES | Facility: HOSPITAL | Age: 63
LOS: 1 days | End: 2023-11-14
Payer: MEDICARE

## 2023-11-14 VITALS
WEIGHT: 130.07 LBS | HEIGHT: 60 IN | TEMPERATURE: 98 F | DIASTOLIC BLOOD PRESSURE: 87 MMHG | OXYGEN SATURATION: 99 % | RESPIRATION RATE: 14 BRPM | HEART RATE: 78 BPM | SYSTOLIC BLOOD PRESSURE: 127 MMHG

## 2023-11-14 DIAGNOSIS — Z98.890 OTHER SPECIFIED POSTPROCEDURAL STATES: Chronic | ICD-10-CM

## 2023-11-14 DIAGNOSIS — R58 HEMORRHAGE, NOT ELSEWHERE CLASSIFIED: Chronic | ICD-10-CM

## 2023-11-14 DIAGNOSIS — M62.9 DISORDER OF MUSCLE, UNSPECIFIED: Chronic | ICD-10-CM

## 2023-11-14 DIAGNOSIS — T84.84XA PAIN DUE TO INTERNAL ORTHOPEDIC PROSTHETIC DEVICES, IMPLANTS AND GRAFTS, INITIAL ENCOUNTER: ICD-10-CM

## 2023-11-14 DIAGNOSIS — Z95.5 PRESENCE OF CORONARY ANGIOPLASTY IMPLANT AND GRAFT: Chronic | ICD-10-CM

## 2023-11-14 DIAGNOSIS — R22.9 LOCALIZED SWELLING, MASS AND LUMP, UNSPECIFIED: Chronic | ICD-10-CM

## 2023-11-14 DIAGNOSIS — D48.9 NEOPLASM OF UNCERTAIN BEHAVIOR, UNSPECIFIED: Chronic | ICD-10-CM

## 2023-11-14 PROCEDURE — G0463: CPT

## 2023-11-14 RX ORDER — SODIUM CHLORIDE 9 MG/ML
3 INJECTION INTRAMUSCULAR; INTRAVENOUS; SUBCUTANEOUS EVERY 8 HOURS
Refills: 0 | Status: DISCONTINUED | OUTPATIENT
Start: 2023-11-16 | End: 2023-11-30

## 2023-11-14 RX ORDER — SODIUM CHLORIDE 9 MG/ML
1000 INJECTION, SOLUTION INTRAVENOUS
Refills: 0 | Status: DISCONTINUED | OUTPATIENT
Start: 2023-11-16 | End: 2023-11-30

## 2023-11-14 RX ORDER — MAGNESIUM OXIDE 400 MG ORAL TABLET 241.3 MG
1 TABLET ORAL
Refills: 0 | DISCHARGE

## 2023-11-14 NOTE — H&P PST ADULT - FUNCTIONAL STATUS
able to climbs stairs, walked from visitors parking lot to PST, cooking, laundry, grocery, dasi mets 6.5/4-10 METS

## 2023-11-14 NOTE — H&P PST ADULT - HISTORY OF PRESENT ILLNESS
63 year old  female with PMH of CAD, S/P PCI/stent in 2015 on aspirin and plavix, Hypothyroidism, HLD, Osteoarthritis right great.  Patient  reports that she underwent Right Foot 1st Metatarsal Phalangeal Joint Fusion on 8/17/23. She reports that it is time to have the internal fixation in right great toe removed.  She presents to [facility] for evaluation prior to scheduled Removal of Ashley Plate RIGHT Foot on 11/16/2023.      63 year old  female with PMH of CAD, S/P PCI/stent in 2015 on aspirin and plavix, longstanding Muscular Disorder, Pituitary Lesion,  Hypothyroidism, HLD, Osteoarthritis right great.  Patient  reports that she underwent Right Foot 1st Metatarsal Phalangeal Joint Fusion on 8/17/23. She reports that it is time to have the internal fixation in right great toe removed.  She presents to PST for evaluation prior to scheduled Removal of Nichols Plate RIGHT Foot on 11/16/2023.

## 2023-11-14 NOTE — H&P PST ADULT - NSICDXPASTSURGICALHX_GEN_ALL_CORE_FT
PAST SURGICAL HISTORY:  Giant cell tumor surgery on right thumb in 2012    H/O colonoscopy     H/O dilation and curettage     H/O eye surgery     H/O thumb surgery     S/P foot surgery     S/P tonsillectomy     Stented coronary artery

## 2023-11-14 NOTE — H&P PST ADULT - NEGATIVE NEUROLOGICAL SYMPTOMS
no weakness/no paresthesias/no generalized seizures/no syncope denies/no transient paralysis/no weakness/no paresthesias/no generalized seizures/no focal seizures/no syncope/no tremors/no vertigo/no loss of sensation/no difficulty walking/no headache/no loss of consciousness/no hemiparesis/no confusion/no facial palsy

## 2023-11-14 NOTE — H&P PST ADULT - PROBLEM SELECTOR PLAN 1
Removal of Ashley Plate RIGHT Foot  -cbc, bmp by PCP 11/13, results on chart  -cardiology evaluation  -medical clearance on chart  -preop instructions provided  -continue low dose aspirin  -last dose of plavix 11/12 Removal of Ashley Plate RIGHT Foot  -cbc, bmp by PCP 11/13, results on chart  -cardiology evaluation on chart  -medical clearance on chart  -preop instructions provided  -continue low dose aspirin  -last dose of plavix 11/12

## 2023-11-14 NOTE — H&P PST ADULT - NEUROLOGICAL
sensation intact/responds to pain/responds to verbal commands details… sensation intact/responds to pain/responds to verbal commands/no spontaneous movement

## 2023-11-14 NOTE — H&P PST ADULT - OTHER CARE PROVIDERS
Cardiologist-- Nadine Santos [place] (last visit october 19th for clearance)// Neurologist-- Cardiologist-- Nadine Santos [place] (last visit october 19th for clearance)// Neurologist--Mamta Soler, [phone]

## 2023-11-14 NOTE — H&P PST ADULT - ATTENDING COMMENTS
Attempted removal of painful internal fixation right. Again reviewed surgical procedures to be performed. Discussed post op course and possible complications. Discussed continued pain syndrome, infection, DVT, AVN, over-, under- correction, recurrence, numbness, burning, delayed healing or wound issues with the patient. After all questions were answered, the patient signed the consent.

## 2023-11-14 NOTE — H&P PST ADULT - NSICDXPASTMEDICALHX_GEN_ALL_CORE_FT
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 Calderon (neurologist, annually (last had sonogram of pituitary in February 2013).    Reactive airway disease     Snoring SUZE precautions -- responds affirmatively to STOP BANG questionnaire -- await results of sleep study done Fall 2013 to confirm diagnosis    Thyroid disorder was treated with radioactive iodine in approximately 1994 and placed on synthroid for 12 years. Medication was stopped by the MD after 12 years    Thyroid nodule multiple. As of 12/12/13, results of biopsy are pending    UTI (urinary tract infection)     Vitamin d deficiency

## 2023-11-15 ENCOUNTER — APPOINTMENT (OUTPATIENT)
Dept: GASTROENTEROLOGY | Facility: AMBULATORY SURGERY CENTER | Age: 63
End: 2023-11-15
Payer: MEDICARE

## 2023-11-15 ENCOUNTER — TRANSCRIPTION ENCOUNTER (OUTPATIENT)
Age: 63
End: 2023-11-15

## 2023-11-15 DIAGNOSIS — Z12.11 ENCOUNTER FOR SCREENING FOR MALIGNANT NEOPLASM OF COLON: ICD-10-CM

## 2023-11-15 PROBLEM — M79.671 RIGHT FOOT PAIN: Status: ACTIVE | Noted: 2022-09-20

## 2023-11-15 PROCEDURE — 45378 DIAGNOSTIC COLONOSCOPY: CPT

## 2023-11-15 RX ORDER — HYDROCORTISONE 2.5% 25 MG/G
2.5 CREAM TOPICAL TWICE DAILY
Qty: 1 | Refills: 3 | Status: ACTIVE | COMMUNITY
Start: 2023-11-15 | End: 1900-01-01

## 2023-11-16 ENCOUNTER — APPOINTMENT (OUTPATIENT)
Dept: PODIATRY | Facility: HOSPITAL | Age: 63
End: 2023-11-16
Payer: MEDICARE

## 2023-11-16 ENCOUNTER — OUTPATIENT (OUTPATIENT)
Dept: OUTPATIENT SERVICES | Facility: HOSPITAL | Age: 63
LOS: 1 days | End: 2023-11-16
Payer: MEDICARE

## 2023-11-16 ENCOUNTER — TRANSCRIPTION ENCOUNTER (OUTPATIENT)
Age: 63
End: 2023-11-16

## 2023-11-16 VITALS
HEART RATE: 55 BPM | HEIGHT: 60 IN | SYSTOLIC BLOOD PRESSURE: 106 MMHG | OXYGEN SATURATION: 100 % | RESPIRATION RATE: 16 BRPM | DIASTOLIC BLOOD PRESSURE: 71 MMHG | WEIGHT: 130.07 LBS | TEMPERATURE: 97 F

## 2023-11-16 VITALS
RESPIRATION RATE: 15 BRPM | OXYGEN SATURATION: 100 % | SYSTOLIC BLOOD PRESSURE: 105 MMHG | DIASTOLIC BLOOD PRESSURE: 62 MMHG | HEART RATE: 72 BPM

## 2023-11-16 DIAGNOSIS — D48.9 NEOPLASM OF UNCERTAIN BEHAVIOR, UNSPECIFIED: Chronic | ICD-10-CM

## 2023-11-16 DIAGNOSIS — Z98.890 OTHER SPECIFIED POSTPROCEDURAL STATES: Chronic | ICD-10-CM

## 2023-11-16 DIAGNOSIS — Z95.5 PRESENCE OF CORONARY ANGIOPLASTY IMPLANT AND GRAFT: Chronic | ICD-10-CM

## 2023-11-16 DIAGNOSIS — Z98.89 OTHER SPECIFIED POSTPROCEDURAL STATES: Chronic | ICD-10-CM

## 2023-11-16 DIAGNOSIS — T84.84XA PAIN DUE TO INTERNAL ORTHOPEDIC PROSTHETIC DEVICES, IMPLANTS AND GRAFTS, INITIAL ENCOUNTER: ICD-10-CM

## 2023-11-16 PROCEDURE — 20680 REMOVAL OF IMPLANT DEEP: CPT | Mod: RT

## 2023-11-16 RX ORDER — LEVOTHYROXINE SODIUM 125 MCG
37.5 TABLET ORAL
Refills: 0 | DISCHARGE

## 2023-11-16 RX ORDER — LIDOCAINE HCL 20 MG/ML
0.2 VIAL (ML) INJECTION ONCE
Refills: 0 | Status: COMPLETED | OUTPATIENT
Start: 2023-11-16 | End: 2023-11-16

## 2023-11-16 RX ORDER — ASCORBIC ACID 60 MG
1 TABLET,CHEWABLE ORAL
Refills: 0 | DISCHARGE

## 2023-11-16 RX ORDER — UBIDECARENONE 100 MG
1 CAPSULE ORAL
Refills: 0 | DISCHARGE

## 2023-11-16 RX ORDER — CLOPIDOGREL BISULFATE 75 MG/1
1 TABLET, FILM COATED ORAL
Refills: 0 | DISCHARGE

## 2023-11-16 RX ORDER — SIMVASTATIN 20 MG/1
1 TABLET, FILM COATED ORAL
Refills: 0 | DISCHARGE

## 2023-11-16 RX ORDER — SODIUM CHLORIDE 9 MG/ML
1000 INJECTION, SOLUTION INTRAVENOUS
Refills: 0 | Status: DISCONTINUED | OUTPATIENT
Start: 2023-11-16 | End: 2023-11-30

## 2023-11-16 RX ORDER — ASPIRIN/CALCIUM CARB/MAGNESIUM 324 MG
1 TABLET ORAL
Refills: 0 | DISCHARGE

## 2023-11-16 RX ORDER — CHLORHEXIDINE GLUCONATE 213 G/1000ML
1 SOLUTION TOPICAL ONCE
Refills: 0 | Status: COMPLETED | OUTPATIENT
Start: 2023-11-16 | End: 2023-11-16

## 2023-11-16 RX ORDER — PYRIDOXINE HCL (VITAMIN B6) 100 MG
0 TABLET ORAL
Refills: 0 | DISCHARGE

## 2023-11-16 RX ORDER — EVOLOCUMAB 140 MG/ML
140 INJECTION, SOLUTION SUBCUTANEOUS
Refills: 0 | DISCHARGE

## 2023-11-16 RX ORDER — ONDANSETRON 8 MG/1
4 TABLET, FILM COATED ORAL ONCE
Refills: 0 | Status: DISCONTINUED | OUTPATIENT
Start: 2023-11-16 | End: 2023-11-30

## 2023-11-16 RX ORDER — OXYCODONE HYDROCHLORIDE 5 MG/1
5 TABLET ORAL ONCE
Refills: 0 | Status: DISCONTINUED | OUTPATIENT
Start: 2023-11-16 | End: 2023-11-16

## 2023-11-16 RX ADMIN — CHLORHEXIDINE GLUCONATE 1 APPLICATION(S): 213 SOLUTION TOPICAL at 06:45

## 2023-11-16 RX ADMIN — SODIUM CHLORIDE 3 MILLILITER(S): 9 INJECTION INTRAMUSCULAR; INTRAVENOUS; SUBCUTANEOUS at 06:32

## 2023-11-16 RX ADMIN — SODIUM CHLORIDE 100 MILLILITER(S): 9 INJECTION, SOLUTION INTRAVENOUS at 06:46

## 2023-11-16 NOTE — ASU DISCHARGE PLAN (ADULT/PEDIATRIC) - NURSING INSTRUCTIONS
Next dose of Tylenol will be on or after 01:30 pm, today, If needed for pain/cramps. Your first dose of Tylenol was given at 07:30 am. Do not exceed more than 4000mg of Tylenol in one 24 hour setting.

## 2023-11-16 NOTE — ASU PATIENT PROFILE, ADULT - FALL HARM RISK - HARM RISK INTERVENTIONS
Assistance with ambulation/Assistance OOB with selected safe patient handling equipment/Communicate Risk of Fall with Harm to all staff/Discuss with provider need for PT consult/Monitor gait and stability/Provide patient with walking aids - walker, cane, crutches/Reinforce activity limits and safety measures with patient and family/Sit up slowly, dangle for a short time, stand at bedside before walking/Tailored Fall Risk Interventions/Use of alarms - bed, chair and/or voice tab/Visual Cue: Yellow wristband and red socks/Bed in lowest position, wheels locked, appropriate side rails in place/Call bell, personal items and telephone in reach/Instruct patient to call for assistance before getting out of bed or chair/Non-slip footwear when patient is out of bed/[place] to call system/Physically safe environment - no spills, clutter or unnecessary equipment/Purposeful Proactive Rounding/Room/bathroom lighting operational, light cord in reach

## 2023-11-16 NOTE — ASU PATIENT PROFILE, ADULT - INTERNATIONAL TRAVEL
Spoke to daughter. Explained Dr. Landeros only does video visits at this time for safety reasons. Daughter states they would like to have her other siblings to attend as well. Explained to daughter we are not sure how they would be able to log in as well to join but they may be on speaker phone to be able to hear or patient and daughter can come into the office to do video visit and we can give code so siblings can log in as well. Daughter will let us know on the day of testing on what they have decided to do.   No

## 2023-11-16 NOTE — ASU DISCHARGE PLAN (ADULT/PEDIATRIC) - CARE PROVIDER_API CALL
Kar Mora  Foot Surgery  [address] [place] [address]  Phone: [phone]  Fax: [phone]  Follow Up Time:

## 2023-11-17 PROBLEM — G72.9 MYOPATHY, UNSPECIFIED: Chronic | Status: ACTIVE | Noted: 2023-11-14

## 2023-11-17 PROBLEM — I25.10 ATHEROSCLEROTIC HEART DISEASE OF NATIVE CORONARY ARTERY WITHOUT ANGINA PECTORIS: Chronic | Status: ACTIVE | Noted: 2023-11-14

## 2023-11-17 PROBLEM — N39.0 URINARY TRACT INFECTION, SITE NOT SPECIFIED: Chronic | Status: ACTIVE | Noted: 2023-11-14

## 2023-11-17 PROCEDURE — 88300 SURGICAL PATH GROSS: CPT

## 2023-11-17 PROCEDURE — 88300 SURGICAL PATH GROSS: CPT | Mod: 26

## 2023-11-17 PROCEDURE — 20680 REMOVAL OF IMPLANT DEEP: CPT

## 2023-11-20 ENCOUNTER — APPOINTMENT (OUTPATIENT)
Dept: PODIATRY | Facility: CLINIC | Age: 63
End: 2023-11-20
Payer: MEDICARE

## 2023-11-20 PROCEDURE — 99024 POSTOP FOLLOW-UP VISIT: CPT

## 2023-11-24 LAB
SURGICAL PATHOLOGY STUDY: SIGNIFICANT CHANGE UP
SURGICAL PATHOLOGY STUDY: SIGNIFICANT CHANGE UP

## 2023-11-27 ENCOUNTER — APPOINTMENT (OUTPATIENT)
Dept: PODIATRY | Facility: CLINIC | Age: 63
End: 2023-11-27
Payer: MEDICARE

## 2023-11-27 DIAGNOSIS — Z48.89 ENCOUNTER FOR OTHER SPECIFIED SURGICAL AFTERCARE: ICD-10-CM

## 2023-11-27 DIAGNOSIS — T84.9XXA UNSPECIFIED COMPLICATION OF INTERNAL ORTHOPEDIC PROSTHETIC DEVICE, IMPLANT AND GRAFT, INITIAL ENCOUNTER: ICD-10-CM

## 2023-11-27 PROCEDURE — 99024 POSTOP FOLLOW-UP VISIT: CPT

## 2023-11-28 PROBLEM — T84.9XXA: Status: ACTIVE | Noted: 2023-11-15

## 2023-11-28 PROBLEM — Z48.89 ENCOUNTER FOR SURGICAL FOLLOW-UP CARE: Status: ACTIVE | Noted: 2023-08-22

## 2023-12-13 NOTE — ASU DISCHARGE PLAN (ADULT/PEDIATRIC) - NS MD DC FALL RISK RISK
PA needed for Nurtec, never received PA from pharmacy but patient went to  medication today and it was not approved.  
For information on Fall & Injury Prevention, visit: [email or web address] OR  [email or web address] OR  https://www.cdc.gov/steadi/patient.html

## 2023-12-14 ENCOUNTER — APPOINTMENT (OUTPATIENT)
Dept: GASTROENTEROLOGY | Facility: CLINIC | Age: 63
End: 2023-12-14
Payer: MEDICARE

## 2023-12-14 VITALS
RESPIRATION RATE: 12 BRPM | OXYGEN SATURATION: 99 % | SYSTOLIC BLOOD PRESSURE: 104 MMHG | WEIGHT: 136 LBS | HEART RATE: 66 BPM | HEIGHT: 60 IN | TEMPERATURE: 97.8 F | BODY MASS INDEX: 26.7 KG/M2 | DIASTOLIC BLOOD PRESSURE: 62 MMHG

## 2023-12-14 DIAGNOSIS — Z80.0 ENCOUNTER FOR SCREENING FOR MALIGNANT NEOPLASM OF COLON: ICD-10-CM

## 2023-12-14 DIAGNOSIS — K59.00 CONSTIPATION, UNSPECIFIED: ICD-10-CM

## 2023-12-14 DIAGNOSIS — K62.89 OTHER SPECIFIED DISEASES OF ANUS AND RECTUM: ICD-10-CM

## 2023-12-14 DIAGNOSIS — K64.8 OTHER HEMORRHOIDS: ICD-10-CM

## 2023-12-14 DIAGNOSIS — R19.4 CHANGE IN BOWEL HABIT: ICD-10-CM

## 2023-12-14 DIAGNOSIS — R19.5 OTHER FECAL ABNORMALITIES: ICD-10-CM

## 2023-12-14 DIAGNOSIS — Z12.11 ENCOUNTER FOR SCREENING FOR MALIGNANT NEOPLASM OF COLON: ICD-10-CM

## 2023-12-14 PROCEDURE — 99214 OFFICE O/P EST MOD 30 MIN: CPT

## 2023-12-14 RX ORDER — HYDROCORTISONE 2.5% 25 MG/G
2.5 CREAM TOPICAL TWICE DAILY
Qty: 1 | Refills: 3 | Status: ACTIVE | COMMUNITY
Start: 2023-12-14 | End: 1900-01-01

## 2023-12-15 PROBLEM — K64.8 INTERNAL HEMORRHOIDS: Status: RESOLVED | Noted: 2023-11-15 | Resolved: 2023-12-14

## 2023-12-15 PROBLEM — K64.8 INTERNAL HEMORRHOIDS: Status: ACTIVE | Noted: 2023-12-15

## 2023-12-15 PROBLEM — Z12.11 ENCOUNTER FOR COLONOSCOPY IN PATIENT WITH FAMILY HISTORY OF COLON CANCER: Status: ACTIVE | Noted: 2023-12-15 | Resolved: 2023-12-28

## 2023-12-15 PROBLEM — K62.89 RECTAL PAIN: Status: ACTIVE | Noted: 2023-12-15

## 2023-12-15 PROBLEM — R19.4 CHANGE IN BOWEL HABITS: Status: ACTIVE | Noted: 2023-09-13

## 2023-12-15 PROBLEM — R19.5 DECREASED STOOL CALIBER: Status: ACTIVE | Noted: 2023-09-13

## 2023-12-15 PROBLEM — K59.00 CONSTIPATION: Status: ACTIVE | Noted: 2023-12-15

## 2023-12-15 NOTE — ASSESSMENT
[FreeTextEntry1] : High Fiber Diet Increase Metamucil to 2 tablespoons in 8 oz of water after dinner. Proctozone 2.5% hemorrhoidal cream 2x/day for 1 week  Repeat colonoscopy in 5 years  Office follow up in 1 month   I spent 31 minutes with the patient and answered all of her questions.

## 2023-12-15 NOTE — CONSULT LETTER
[Dear  ___] : Dear  [unfilled], [Consult Letter:] : I had the pleasure of evaluating your patient, [unfilled]. [( Thank you for referring [unfilled] for consultation for _____ )] : Thank you for referring [unfilled] for consultation for [unfilled] [Please see my note below.] : Please see my note below. [Consult Closing:] : Thank you very much for allowing me to participate in the care of this patient.  If you have any questions, please do not hesitate to contact me. [Sincerely,] : Sincerely, [FreeTextEntry3] : Keith Kidd MD  Gastroenterology [facility]

## 2023-12-15 NOTE — REASON FOR VISIT
[FreeTextEntry1] : Change in bowel habits.  Constipation. Decreased caliber of stool.  Rectal pain.  Family history of colon cancer

## 2023-12-15 NOTE — HISTORY OF PRESENT ILLNESS
[FreeTextEntry1] : Recent colonoscopy was normal.  Large internal hemorrhoids were seen.  She was placed on a high-fiber diet and Metamucil 1 tablespoon in ounces of water in the evening.  This is helping somewhat but not completely.. She denies abdominal pain or rectal bleeding,

## 2023-12-15 NOTE — ADDENDUM
[FreeTextEntry1] : Continue high-fiber diet Increase Metamucil to 2 tablespoons in  8 ounces of water in the evening and after few days if this has not completely resolved her  constipation,  then further increase up to 3 tablespoons. Proctozone 2.5% hemorrhoidal cream twice a day for 1 week was ordered.  Repeat colonoscopy in 5 years  Office follow-up in 1 month

## 2024-03-15 ENCOUNTER — APPOINTMENT (OUTPATIENT)
Dept: GASTROENTEROLOGY | Facility: CLINIC | Age: 64
End: 2024-03-15

## 2024-04-03 NOTE — ASU DISCHARGE PLAN (ADULT/PEDIATRIC) - PHYSICIAN SECTION COMPLETE
Yes

## 2025-03-04 ENCOUNTER — APPOINTMENT (OUTPATIENT)
Age: 65
End: 2025-03-04

## 2025-03-04 ENCOUNTER — NON-APPOINTMENT (OUTPATIENT)
Age: 65
End: 2025-03-04

## 2025-03-04 PROCEDURE — 92014 COMPRE OPH EXAM EST PT 1/>: CPT

## 2025-03-04 PROCEDURE — 92134 CPTRZ OPH DX IMG PST SGM RTA: CPT

## 2025-03-04 PROCEDURE — 92020 GONIOSCOPY: CPT

## 2025-09-17 ENCOUNTER — NON-APPOINTMENT (OUTPATIENT)
Age: 65
End: 2025-09-17

## 2025-09-17 ENCOUNTER — APPOINTMENT (OUTPATIENT)
Age: 65
End: 2025-09-17
Payer: MEDICARE

## 2025-09-17 PROCEDURE — 92250 FUNDUS PHOTOGRAPHY W/I&R: CPT

## 2025-09-17 PROCEDURE — 92012 INTRM OPH EXAM EST PATIENT: CPT

## 2025-09-17 PROCEDURE — 92020 GONIOSCOPY: CPT

## (undated) DEVICE — SUT MONOSOF 4-0 18" C-13

## (undated) DEVICE — FOR-TOURNIQUET 27679911: Type: DURABLE MEDICAL EQUIPMENT

## (undated) DEVICE — POSITIONER STRAP ARMBOARD VELCRO TS-30

## (undated) DEVICE — TOURNIQUET ESMARK 4"

## (undated) DEVICE — DRSG CURITY GAUZE SPONGE 4 X 4" 12-PLY

## (undated) DEVICE — Device

## (undated) DEVICE — SOL IRR POUR NS 0.9% 500ML

## (undated) DEVICE — TOURNIQUET CUFF 18" DUAL PORT SINGLE BLADDER LUER LOCK (BLACK)

## (undated) DEVICE — DRILL BIT WRIGHT MEDICAL 2.0MM

## (undated) DEVICE — GLV 7.5 PROTEXIS (WHITE)

## (undated) DEVICE — SOL IRR POUR H2O 250ML

## (undated) DEVICE — ELCTR GROUNDING PAD ADULT COVIDIEN

## (undated) DEVICE — SHOE  POSTOP SOFTIE DARCO M-M

## (undated) DEVICE — SUT ETHILON 4-0 18" PS-2

## (undated) DEVICE — GLV 7.5 PROTEXIS (BLUE)

## (undated) DEVICE — DRAPE SPLIT SHEET 77" X 108"

## (undated) DEVICE — DRSG STOCKINETTE TUBULAR 6"

## (undated) DEVICE — NDL HYPO REGULAR BEVEL 25G X 1.5" (BLUE)

## (undated) DEVICE — DRSG WEBRIL 4"

## (undated) DEVICE — WARMING BLANKET UPPER ADULT

## (undated) DEVICE — DRAPE 1/2 SHEET 40X57"

## (undated) DEVICE — FRAZIER SUCTION TIP 8FR

## (undated) DEVICE — BLADE SCALPEL SAFETYLOCK #15

## (undated) DEVICE — DRILL BIT WRIGHT MEDICAL 2.5MM

## (undated) DEVICE — VENODYNE/SCD SLEEVE CALF MEDIUM

## (undated) DEVICE — DRSG ACE BANDAGE 4" NS

## (undated) DEVICE — BLADE SURGICAL #15 CARBON

## (undated) DEVICE — VENODYNE/SCD SLEEVE CALF LARGE

## (undated) DEVICE — SUT VICRYL 4-0 18" PS-2 UNDYED

## (undated) DEVICE — DRSG XEROFORM 1 X 8"

## (undated) DEVICE — SPECIMEN CONTAINER 100ML

## (undated) DEVICE — VENODYNE/SCD SLEEVE CALF BARIATRIC

## (undated) DEVICE — FOR-ESU VALLEYLAB T7E14848DX: Type: DURABLE MEDICAL EQUIPMENT

## (undated) DEVICE — SHOE  POSTOP SOFTIE DARCO M-SM

## (undated) DEVICE — POSITIONER FOAM EGG CRATE ULNAR 2PCS (PINK)

## (undated) DEVICE — DRAPE TOWEL BLUE 17" X 24"

## (undated) DEVICE — SYR LUER LOK 10CC

## (undated) DEVICE — SHOE  POSTOP SOFTIE DARCO M-LG

## (undated) DEVICE — LABELS BLANK W PEN

## (undated) DEVICE — PREP CHLORAPREP HI-LITE ORANGE 26ML

## (undated) DEVICE — DRAPE INSTRUMENT POUCH 6.75" X 11"

## (undated) DEVICE — SUT POLYSORB 3-0 18" P-12 UNDYED

## (undated) DEVICE — SUT VICRYL 3-0 18" PS-2 UNDYED

## (undated) DEVICE — DRSG STOCKINETTE TUBULAR COTTON 1PLY 6X72"

## (undated) DEVICE — PACK MINOR

## (undated) DEVICE — DRSG KLING 3"

## (undated) DEVICE — SAW BLADE MICROAIRE SAGITTAL 9.4MMX25.4MMX0.6MM

## (undated) DEVICE — NDL HYPO SAFE 18G X 1.5" (PINK)

## (undated) DEVICE — GLV 8.5 PROTEXIS (BLUE)

## (undated) DEVICE — PACK LIJ BASIC ORTHO

## (undated) DEVICE — TOURNIQUET CUFF 18" DUAL PORT DUAL BLADDER W PLC (BLACK)

## (undated) DEVICE — DRSG ACE BANDAGE 3"

## (undated) DEVICE — DRSG KLING 4"

## (undated) DEVICE — FOR-ESU VALLEYLAB T7E14999DX: Type: DURABLE MEDICAL EQUIPMENT

## (undated) DEVICE — CANISTER DISPOSABLE THIN WALL 3000CC